# Patient Record
Sex: FEMALE | Race: WHITE | NOT HISPANIC OR LATINO | Employment: FULL TIME | URBAN - METROPOLITAN AREA
[De-identification: names, ages, dates, MRNs, and addresses within clinical notes are randomized per-mention and may not be internally consistent; named-entity substitution may affect disease eponyms.]

---

## 2017-01-16 VITALS
WEIGHT: 283.5 LBS | HEIGHT: 63 IN | BODY MASS INDEX: 50.23 KG/M2 | DIASTOLIC BLOOD PRESSURE: 76 MMHG | SYSTOLIC BLOOD PRESSURE: 145 MMHG | HEART RATE: 74 BPM | RESPIRATION RATE: 18 BRPM | TEMPERATURE: 96.6 F

## 2017-01-24 ENCOUNTER — OFFICE VISIT (OUTPATIENT)
Dept: BARIATRICS/WEIGHT MGMT | Facility: CLINIC | Age: 57
End: 2017-01-24

## 2017-01-24 VITALS
SYSTOLIC BLOOD PRESSURE: 136 MMHG | DIASTOLIC BLOOD PRESSURE: 89 MMHG | HEART RATE: 87 BPM | BODY MASS INDEX: 43.68 KG/M2 | WEIGHT: 246.5 LBS | TEMPERATURE: 97.5 F | HEIGHT: 63 IN

## 2017-01-24 DIAGNOSIS — R53.83 FATIGUE, UNSPECIFIED TYPE: Primary | ICD-10-CM

## 2017-01-24 DIAGNOSIS — Z98.84 S/P BARIATRIC SURGERY: ICD-10-CM

## 2017-01-24 DIAGNOSIS — I10 ESSENTIAL HYPERTENSION: ICD-10-CM

## 2017-01-24 DIAGNOSIS — E78.5 HYPERLIPIDEMIA, UNSPECIFIED HYPERLIPIDEMIA TYPE: ICD-10-CM

## 2017-01-24 DIAGNOSIS — E66.01 MORBID OBESITY, UNSPECIFIED OBESITY TYPE (HCC): ICD-10-CM

## 2017-01-24 DIAGNOSIS — K21.9 GASTROESOPHAGEAL REFLUX DISEASE, ESOPHAGITIS PRESENCE NOT SPECIFIED: ICD-10-CM

## 2017-01-24 DIAGNOSIS — E11.69 DIABETES MELLITUS TYPE 2 IN OBESE (HCC): ICD-10-CM

## 2017-01-24 DIAGNOSIS — E66.9 DIABETES MELLITUS TYPE 2 IN OBESE (HCC): ICD-10-CM

## 2017-01-24 PROCEDURE — 99214 OFFICE O/P EST MOD 30 MIN: CPT | Performed by: PHYSICIAN ASSISTANT
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
## 2017-06-23 ENCOUNTER — OFFICE VISIT (OUTPATIENT)
Dept: BARIATRICS/WEIGHT MGMT | Facility: CLINIC | Age: 57
End: 2017-06-23

## 2017-06-23 ENCOUNTER — DOCUMENTATION (OUTPATIENT)
Dept: BARIATRICS/WEIGHT MGMT | Facility: HOSPITAL | Age: 57
End: 2017-06-23

## 2017-06-23 VITALS
HEIGHT: 63 IN | SYSTOLIC BLOOD PRESSURE: 122 MMHG | RESPIRATION RATE: 18 BRPM | TEMPERATURE: 97.8 F | DIASTOLIC BLOOD PRESSURE: 78 MMHG | BODY MASS INDEX: 41.64 KG/M2 | HEART RATE: 92 BPM | OXYGEN SATURATION: 99 % | WEIGHT: 235 LBS

## 2017-06-23 DIAGNOSIS — E03.9 HYPOTHYROIDISM, UNSPECIFIED TYPE: ICD-10-CM

## 2017-06-23 DIAGNOSIS — E66.9 DIABETES MELLITUS TYPE 2 IN OBESE (HCC): ICD-10-CM

## 2017-06-23 DIAGNOSIS — Z98.84 S/P BARIATRIC SURGERY: ICD-10-CM

## 2017-06-23 DIAGNOSIS — I10 ESSENTIAL HYPERTENSION: ICD-10-CM

## 2017-06-23 DIAGNOSIS — E66.01 MORBID OBESITY, UNSPECIFIED OBESITY TYPE (HCC): ICD-10-CM

## 2017-06-23 DIAGNOSIS — E11.69 DIABETES MELLITUS TYPE 2 IN OBESE (HCC): ICD-10-CM

## 2017-06-23 DIAGNOSIS — E78.5 HYPERLIPIDEMIA, UNSPECIFIED HYPERLIPIDEMIA TYPE: ICD-10-CM

## 2017-06-23 DIAGNOSIS — R53.83 FATIGUE, UNSPECIFIED TYPE: Primary | ICD-10-CM

## 2017-06-23 PROCEDURE — 94690 O2 UPTK REST INDIRECT: CPT | Performed by: PHYSICIAN ASSISTANT

## 2017-06-23 PROCEDURE — 99214 OFFICE O/P EST MOD 30 MIN: CPT | Performed by: PHYSICIAN ASSISTANT

## 2017-06-23 NOTE — PROGRESS NOTES
[facility] Bariatric Surgery  [address]  [facility] [address]  [phone]        Patient Name: Nataliya Olmstead.  YOB: 1960      Date of Visit: 6/23/2017      Reason for Visit:  18 months postop    HPI:  Nataliya Olmstead is a 56 y.o. female s/p LSG by GDW on 12/18/15.      LOV 1/2017 for annual eval.  Didn't get labs done, so returns today to have labs.  No issues/concerns.  Continues to focus on high protein, low carb foods and being mindful of portion control.  Exercising 2 days/week.  Continues on recommended vitamins.  Last labs 3/2016 WNL.      Presurgery weight: 283 lbs.  Down 48 lbs total.      Past Medical History:   Diagnosis Date   • Anxiety    • Chronic back pain    • Coronary artery disease     angioplasty 2005, no stent   • Depression    • Diabetes mellitus     dx 2000, on insulin   • Dyspnea on exertion    • Fatty liver disease, nonalcoholic    • GERD (gastroesophageal reflux disease)     on Omeprazole BID   • HTN (hypertension)    • Hyperlipidemia    • Hypothyroidism    • Morbid obesity    • Vitamin B12 deficiency    • Vitamin D deficiency      Past Surgical History:   Procedure Laterality Date   • BACK SURGERY  2000    L3/4 HNP   • CORONARY ANGIOPLASTY  2005    no stent   • GASTRIC SLEEVE LAPAROSCOPIC  2015    s/p LSG by GDW on 12/18/15   • HYSTERECTOMY  2006    (open) partial    • LAPAROSCOPIC APPENDECTOMY  2006   • LAPAROSCOPIC CHOLECYSTECTOMY  2014    for stones   • URETER SURGERY  1965     Outpatient Prescriptions Marked as Taking for the 6/23/17 encounter (Office Visit) with ALEXEI Ortiz   Medication Sig Dispense Refill   • aspirin 81 MG EC tablet Take 81 mg by mouth daily.     • atorvastatin (LIPITOR) 80 MG tablet Take 80 mg by mouth every night.     • Calcium Carb-Cholecalciferol (CALCIUM + D3 PO) Take 600 mg by mouth daily.     • Cholecalciferol (VITAMIN D3) 5000 UNITS tablet Take 5,000 Units by mouth 2 (two) times a day.     • docusate  "sodium (COLACE) 100 MG capsule Take 100 mg by mouth 2 (two) times a day.     • DULoxetine (CYMBALTA) 20 MG capsule Take 20 mg by mouth daily.     • escitalopram (LEXAPRO) 20 MG tablet Take 20 mg by mouth daily.     • insulin NPH-insulin regular (Novolin 70/30) (70-30) 100 UNIT/ML injection Inject 34 Units under the skin 2 (two) times a day with meals.     • levothyroxine (SYNTHROID, LEVOTHROID) 150 MCG tablet Take 150 mcg by mouth daily.     • Magnesium 250 MG tablet Take 250 mg by mouth daily.     • Multiple Vitamins-Minerals (CEROVITE PO) Take 1 tablet by mouth daily.     • omeprazole (PriLOSEC) 40 MG capsule Take 40 mg by mouth 2 (Two) Times a Day.     • Potassium 99 MG tablet Take 1 tablet by mouth daily.       Allergies   Allergen Reactions   • Ciprofloxacin      Social History     Social History   • Marital status: Single     Spouse name: N/A   • Number of children: N/A   • Years of education: N/A     Occupational History   • Not on file.     Social History Main Topics   • Smoking status: Never Smoker   • Smokeless tobacco: Not on file   • Alcohol use No   • Drug use: No   • Sexual activity: Not on file      Comment: single     Other Topics Concern   • Not on file     Social History Narrative       /78 (BP Location: Left arm, Patient Position: Sitting, Cuff Size: Large Adult)  Pulse 92  Temp 97.8 °F (36.6 °C) (Temporal Artery )   Resp 18  Ht 63\" (160 cm)  Wt 235 lb (107 kg)  SpO2 99%  BMI 41.63 kg/m2    Physical Exam   Constitutional: She appears well-developed and well-nourished. She is cooperative.   obese   HENT:   Mouth/Throat: Oropharynx is clear and moist and mucous membranes are normal.   Eyes: Conjunctivae are normal. No scleral icterus.   Cardiovascular: Normal rate and regular rhythm.    Pulmonary/Chest: Effort normal and breath sounds normal.   Abdominal: Soft. Bowel sounds are normal. There is no tenderness.   Musculoskeletal: She exhibits no edema.   Neurological: She is alert. "   Skin: Skin is warm and dry. No rash noted.   Psychiatric: She has a normal mood and affect. Judgment normal.         Assessment:  s/p LSG by DEANNAW on 12/18/15    ICD-10-CM ICD-9-CM   1. Fatigue, unspecified type R53.83 780.79   2. Diabetes mellitus type 2 in obese E11.9 250.00    E66.9 278.00   3. Essential hypertension I10 401.9   4. Hyperlipidemia, unspecified hyperlipidemia type E78.5 272.4   5. Hypothyroidism, unspecified type E03.9 244.9   6. Morbid obesity, unspecified obesity type E66.01 278.01   7. S/P bariatric surgery Z98.84 V45.86       Plan: Continue w/ good food choices and healthy habits.  BMR today to determine WLZ.  Encouraged to track intake.  Will meet w/ dietitian today as well.   Full bariatric panel reordered.  Further recommendations pending results.  Call w/ issues/concerns.  RTC sooner w/ problems.      The patient was instructed to follow up in 6 months.     note: approx 15 of the 25 minute visit was spent counseling on dietary/lifestyle modifications

## 2017-06-23 NOTE — PROGRESS NOTES
Weight Loss Surgery  Follow-up    Nataliya Olmstead  06/23/2017  [id number]  [id number]  1960  female    Wt 235 lb (107 kg) BMI 41.63 kg/m2  Past Medical History:   Diagnosis Date   • Anxiety    • Chronic back pain    • Coronary artery disease     angioplasty 2005, no stent   • Depression    • Diabetes mellitus     dx 2000, on insulin   • Dyspnea on exertion    • Fatty liver disease, nonalcoholic    • GERD (gastroesophageal reflux disease)     on Omeprazole BID   • HTN (hypertension)    • Hyperlipidemia    • Hypothyroidism    • Morbid obesity    • Vitamin B12 deficiency    • Vitamin D deficiency      Past Surgical History:   Procedure Laterality Date   • BACK SURGERY  2000    L3/4 HNP   • CORONARY ANGIOPLASTY  2005    no stent   • GASTRIC SLEEVE LAPAROSCOPIC  2015    s/p LSG by GDW on 12/18/15   • HYSTERECTOMY  2006    (open) partial    • LAPAROSCOPIC APPENDECTOMY  2006   • LAPAROSCOPIC CHOLECYSTECTOMY  2014    for stones   • URETER SURGERY  1965     Allergies   Allergen Reactions   • Ciprofloxacin        Current Outpatient Prescriptions:   •  aspirin 81 MG EC tablet, Take 81 mg by mouth daily., Disp: , Rfl:   •  atorvastatin (LIPITOR) 80 MG tablet, Take 80 mg by mouth every night., Disp: , Rfl:   •  Calcium Carb-Cholecalciferol (CALCIUM + D3 PO), Take 600 mg by mouth daily., Disp: , Rfl:   •  Cholecalciferol (VITAMIN D3) 5000 UNITS tablet, Take 5,000 Units by mouth 2 (two) times a day., Disp: , Rfl:   •  docusate sodium (COLACE) 100 MG capsule, Take 100 mg by mouth 2 (two) times a day., Disp: , Rfl:   •  DULoxetine (CYMBALTA) 20 MG capsule, Take 20 mg by mouth daily., Disp: , Rfl:   •  escitalopram (LEXAPRO) 20 MG tablet, Take 20 mg by mouth daily., Disp: , Rfl:   •  insulin NPH-insulin regular (Novolin 70/30) (70-30) 100 UNIT/ML injection, Inject 34 Units under the skin 2 (two) times a day with meals., Disp: , Rfl:   •  levothyroxine (SYNTHROID, LEVOTHROID) 150 MCG tablet, Take 150 mcg by mouth daily., Disp: , Rfl:    •  Magnesium 250 MG tablet, Take 250 mg by mouth daily., Disp: , Rfl:   •  Multiple Vitamins-Minerals (CEROVITE PO), Take 1 tablet by mouth daily., Disp: , Rfl:   •  omeprazole (PriLOSEC) 40 MG capsule, Take 40 mg by mouth 2 (Two) Times a Day., Disp: , Rfl:   •  Potassium 99 MG tablet, Take 1 tablet by mouth daily., Disp: , Rfl:         Patient had BMR today.  I reviewed and discussed with patient.  Recommend 5584-2622 calories per day,  grams protein.        Education    Provided manual:  Sleeve Gastrectomy  Provided new manual as patient could not recall if she still had hers.    I discussed healthy meal and snack options.        Nutrition Goals   Dietary Guidelines per manual  Protein goal:  grams per day   Decrease use of artificial sweeteners.  Increase plain water intake.        Exercise Goals  Continue current exercise routine   Add 15-30 minutes of activity per day as tolerated      Renetta Helms, ELISSA  06/23/2017  11:20 AM

## 2017-06-29 LAB
25(OH)D3+25(OH)D2 SERPL-MCNC: 24.1 NG/ML
A-TOCOPHEROL VIT E SERPL-MCNC: 16.5 MG/L (ref 5.3–16.8)
ALBUMIN SERPL-MCNC: 4 G/DL (ref 3.2–4.8)
ALBUMIN/GLOB SERPL: 1.5 G/DL (ref 1.5–2.5)
ALP SERPL-CCNC: 122 U/L (ref 25–100)
ALT SERPL-CCNC: 21 U/L (ref 7–40)
AST SERPL-CCNC: 23 U/L (ref 0–33)
BASOPHILS # BLD AUTO: 0.02 10*3/MM3 (ref 0–0.2)
BASOPHILS NFR BLD AUTO: 0.3 % (ref 0–1)
BILIRUB SERPL-MCNC: 0.4 MG/DL (ref 0.3–1.2)
BUN SERPL-MCNC: 12 MG/DL (ref 9–23)
BUN/CREAT SERPL: 13.3 (ref 7–25)
CALCIUM SERPL-MCNC: 9.9 MG/DL (ref 8.7–10.4)
CHLORIDE SERPL-SCNC: 104 MMOL/L (ref 99–109)
CO2 SERPL-SCNC: 30 MMOL/L (ref 20–31)
CREAT SERPL-MCNC: 0.9 MG/DL (ref 0.6–1.3)
EOSINOPHIL # BLD AUTO: 0.08 10*3/MM3 (ref 0.1–0.3)
EOSINOPHIL NFR BLD AUTO: 1.3 % (ref 0–3)
ERYTHROCYTE [DISTWIDTH] IN BLOOD BY AUTOMATED COUNT: 14.1 % (ref 11.3–14.5)
FERRITIN SERPL-MCNC: 88 NG/ML (ref 10–291)
FOLATE SERPL-MCNC: 22.14 NG/ML (ref 3.2–20)
GLOBULIN SER CALC-MCNC: 2.6 GM/DL
GLUCOSE SERPL-MCNC: 169 MG/DL (ref 70–100)
HCT VFR BLD AUTO: 44.3 % (ref 34.5–44)
HGB BLD-MCNC: 14.1 G/DL (ref 11.5–15.5)
IMM GRANULOCYTES # BLD: 0.01 10*3/MM3 (ref 0–0.03)
IMM GRANULOCYTES NFR BLD: 0.2 % (ref 0–0.6)
IRON SERPL-MCNC: 87 MCG/DL (ref 50–175)
LYMPHOCYTES # BLD AUTO: 1.2 10*3/MM3 (ref 0.6–4.8)
LYMPHOCYTES NFR BLD AUTO: 20.1 % (ref 24–44)
MAGNESIUM SERPL-MCNC: 1.7 MG/DL (ref 1.3–2.7)
MCH RBC QN AUTO: 28.9 PG (ref 27–31)
MCHC RBC AUTO-ENTMCNC: 31.8 G/DL (ref 32–36)
MCV RBC AUTO: 90.8 FL (ref 80–99)
METHYLMALONATE SERPL-SCNC: 267 NMOL/L (ref 0–378)
MONOCYTES # BLD AUTO: 0.31 10*3/MM3 (ref 0–1)
MONOCYTES NFR BLD AUTO: 5.2 % (ref 0–12)
NEUTROPHILS # BLD AUTO: 4.35 10*3/MM3 (ref 1.5–8.3)
NEUTROPHILS NFR BLD AUTO: 72.9 % (ref 41–71)
PHOSPHATE SERPL-MCNC: 3.3 MG/DL (ref 2.4–5.1)
PLATELET # BLD AUTO: 198 10*3/MM3 (ref 150–450)
POTASSIUM SERPL-SCNC: 4.3 MMOL/L (ref 3.5–5.5)
PREALB SERPL-MCNC: 22 MG/DL (ref 10–36)
PROT SERPL-MCNC: 6.6 G/DL (ref 5.7–8.2)
PTH-INTACT SERPL-MCNC: 43 PG/ML (ref 15–65)
RBC # BLD AUTO: 4.88 10*6/MM3 (ref 3.89–5.14)
SODIUM SERPL-SCNC: 142 MMOL/L (ref 132–146)
VIT A SERPL-MCNC: 45 UG/DL (ref 20–65)
VIT B1 BLD-SCNC: 167 NMOL/L (ref 66.5–200)
WBC # BLD AUTO: 5.97 10*3/MM3 (ref 3.5–10.8)
ZINC SERPL-MCNC: 78 UG/DL (ref 56–134)

## 2017-07-03 RX ORDER — ERGOCALCIFEROL 1.25 MG/1
50000 CAPSULE ORAL WEEKLY
Qty: 12 CAPSULE | Refills: 0 | Status: SHIPPED | OUTPATIENT
Start: 2017-07-03